# Patient Record
Sex: FEMALE | Race: WHITE
[De-identification: names, ages, dates, MRNs, and addresses within clinical notes are randomized per-mention and may not be internally consistent; named-entity substitution may affect disease eponyms.]

---

## 2019-01-23 PROBLEM — Z87.898 HISTORY OF MULTIPLE PULMONARY NODULES: Status: RESOLVED | Noted: 2019-01-23 | Resolved: 2019-01-23

## 2019-01-23 PROBLEM — F17.200 CURRENT EVERY DAY SMOKER: Status: ACTIVE | Noted: 2019-01-23

## 2019-01-23 PROBLEM — Z87.09 HISTORY OF CHRONIC OBSTRUCTIVE LUNG DISEASE: Status: RESOLVED | Noted: 2019-01-23 | Resolved: 2019-01-23

## 2019-01-23 RX ORDER — OMEPRAZOLE 20 MG/1
20 CAPSULE, DELAYED RELEASE ORAL
Refills: 0 | Status: ACTIVE | COMMUNITY

## 2019-01-23 RX ORDER — MORPHINE SULFATE 30 MG
TABLET ORAL
Refills: 0 | Status: ACTIVE | COMMUNITY

## 2019-01-23 RX ORDER — OXYCODONE AND ACETAMINOPHEN TABLETS 10; 300 MG/1; MG/1
TABLET ORAL
Refills: 0 | Status: ACTIVE | COMMUNITY

## 2019-01-24 ENCOUNTER — APPOINTMENT (OUTPATIENT)
Dept: THORACIC SURGERY | Facility: CLINIC | Age: 64
End: 2019-01-24
Payer: MEDICARE

## 2019-01-24 VITALS
SYSTOLIC BLOOD PRESSURE: 150 MMHG | HEIGHT: 62 IN | WEIGHT: 108 LBS | TEMPERATURE: 98.7 F | DIASTOLIC BLOOD PRESSURE: 65 MMHG | HEART RATE: 66 BPM | OXYGEN SATURATION: 89 % | RESPIRATION RATE: 19 BRPM | BODY MASS INDEX: 19.88 KG/M2

## 2019-01-24 DIAGNOSIS — F17.200 NICOTINE DEPENDENCE, UNSPECIFIED, UNCOMPLICATED: ICD-10-CM

## 2019-01-24 DIAGNOSIS — Z87.898 PERSONAL HISTORY OF OTHER SPECIFIED CONDITIONS: ICD-10-CM

## 2019-01-24 DIAGNOSIS — Z87.09 PERSONAL HISTORY OF OTHER DISEASES OF THE RESPIRATORY SYSTEM: ICD-10-CM

## 2019-01-24 DIAGNOSIS — R91.1 SOLITARY PULMONARY NODULE: ICD-10-CM

## 2019-01-24 PROCEDURE — 99214 OFFICE O/P EST MOD 30 MIN: CPT

## 2019-01-24 NOTE — REVIEW OF SYSTEMS
Capillary refill less/equal to 2 seconds/Strong peripheral pulses [Red Eyes] : red eyes [As Noted in HPI] : as noted in HPI [Negative] : Heme/Lymph

## 2019-01-29 NOTE — END OF VISIT
[FreeTextEntry3] : Documented by Chandler Tran acting as a scribe for Dr. Jan Montgomery on 1/24/2019

## 2019-01-29 NOTE — HISTORY OF PRESENT ILLNESS
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 \par \par CT scan completed on 1/11/19:\par -  interval cluster of centrilobular small nodules with tree in bud configuration within the middle lobe of the RIGHT lung. Appearance is most typical of small airway infectious/ inflammatory disease including with possible atypical mycobacterial infection.\par - site of ground glass opacity seen previously are either stable or have resolved, which may have represented sites of additional small airway disease\par - no pathologically enlarged axillary, mediastinal, or hilar adenopathy\par \par On this visit, the patient denies any complaints of a cough, hemoptysis, SOB, chest discomfort, palpitations, headaches, visual disturbances, decreased appetite, unexplained weight loss, fevers, aches, chills, or recent hospitalizations.

## 2019-01-29 NOTE — DATA REVIEWED
[FreeTextEntry1] : CT scan completed on 1/11/19:\par -  interval cluster of centrilobular small nodules with tree in bud configuration within the middle lobe of the RIGHT lung. Appearance is most typical of small airway infectious/ inflammatory disease including with possible atypical mycobacterial infection.\par - site of ground glass opacity seen previously are either stable or have resolved, which may have represented sites of additional small airway disease\par - no pathologically enlarged axillary, mediastinal, or hilar adenopathy

## 2019-01-29 NOTE — PHYSICAL EXAM
[General Appearance - Alert] : alert [Sclera] : the sclera and conjunctiva were normal [Outer Ear] : the ears and nose were normal in appearance [Hearing Threshold Finger Rub Not [place]] : hearing was normal [Both Tympanic Membranes Were Examined] : both tympanic membranes were normal [Neck Cervical Mass (___cm)] : no neck mass was observed [Respiration, Rhythm And Depth] : normal respiratory rhythm and effort [Exaggerated Use Of Accessory Muscles For Inspiration] : no accessory muscle use [Auscultation Breath Sounds / Voice Sounds] : lungs were clear to auscultation bilaterally [Heart Rate And Rhythm] : heart rate was normal and rhythm regular [Heart Sounds] : normal S1 and S2 [Chest Visual Inspection Thoracic Asymmetry] : no chest asymmetry [Diminished Respiratory Excursion] : normal chest expansion [2+] : left 2+ [No Pulse Delay] : no pulse delay [Bowel Sounds] : normal bowel sounds [Abdomen Soft] : soft [Abdomen Tenderness] : non-tender [Cervical Lymph Nodes Enlarged Posterior Bilaterally] : posterior cervical [Cervical Lymph Nodes Enlarged Anterior Bilaterally] : anterior cervical [Supraclavicular Lymph Nodes Enlarged Bilaterally] : supraclavicular [Axillary Lymph Nodes Enlarged Bilaterally] : axillary [No CVA Tenderness] : no ~M costovertebral angle tenderness [Involuntary Movements] : no involuntary movements were seen [Motor Tone] : muscle strength and tone were normal [] : no rash [Skin Lesions] : no skin lesions [Sensation] : the sensory exam was normal to light touch and pinprick [Motor Exam] : the motor exam was normal [Oriented To Time, Place, And Person] : oriented to person, place, and time [Impaired Insight] : insight and judgment were intact [Fingers] :  capillary refill of the fingers was normal [FreeTextEntry1] : n/a

## 2019-01-29 NOTE — ASSESSMENT
[FreeTextEntry1] : 63 year old female, current smoker (1 PPD, 62 pack years), with PMHx of COPD, acoustic neuroma s/p surgery in 1991, palpitations, chronic tail bone pain managed with narcotics, who presents to review recent surveillance CT scan. \par \par Overall, she is doing well. Denies any shortness of breath, cough, hemoptysis, chest pain, or other medical complaints at this time. CT Chest was reviewed with the patient. She and her  were made aware of an interval cluster of centrilobular small nodules with tree in bud configuration within the right middle lobe, appearance is most typical of small airway infectious/ inflammatory disease including with possible atypical mycobacterial infection. The numerous pulmonary parenchymal nodules seen on the prior study are essentially stable. The site of ground glass opacity seen previously are either stable or have resolved, which may have represented sites of additional small airway disease. We recommended a repeat CT chest in 6 months for reassessment. \par \par I have reviewed the patient's medical records and diagnostic images at the time of this office visit and have made the following recommendations\par Plan:\par 1. RTO in 6 months with repeat CT chest\par \par

## 2024-03-14 ENCOUNTER — APPOINTMENT (OUTPATIENT)
Dept: HEART AND VASCULAR | Facility: CLINIC | Age: 69
End: 2024-03-14
Payer: MEDICARE

## 2024-03-14 VITALS
WEIGHT: 117 LBS | HEIGHT: 62 IN | BODY MASS INDEX: 21.53 KG/M2 | RESPIRATION RATE: 12 BRPM | HEART RATE: 64 BPM | DIASTOLIC BLOOD PRESSURE: 70 MMHG | SYSTOLIC BLOOD PRESSURE: 122 MMHG

## 2024-03-14 DIAGNOSIS — Z87.891 PERSONAL HISTORY OF NICOTINE DEPENDENCE: ICD-10-CM

## 2024-03-14 DIAGNOSIS — R06.02 SHORTNESS OF BREATH: ICD-10-CM

## 2024-03-14 DIAGNOSIS — Z00.00 ENCOUNTER FOR GENERAL ADULT MEDICAL EXAMINATION W/OUT ABNORMAL FINDINGS: ICD-10-CM

## 2024-03-14 DIAGNOSIS — R09.89 OTHER SPECIFIED SYMPTOMS AND SIGNS INVOLVING THE CIRCULATORY AND RESPIRATORY SYSTEMS: ICD-10-CM

## 2024-03-14 PROCEDURE — 93880 EXTRACRANIAL BILAT STUDY: CPT

## 2024-03-14 PROCEDURE — 93306 TTE W/DOPPLER COMPLETE: CPT

## 2024-03-14 PROCEDURE — 99204 OFFICE O/P NEW MOD 45 MIN: CPT

## 2024-03-14 PROCEDURE — ZZZZZ: CPT

## 2024-03-14 PROCEDURE — 93000 ELECTROCARDIOGRAM COMPLETE: CPT

## 2024-03-14 RX ORDER — BACLOFEN 10 MG/1
10 TABLET ORAL
Refills: 0 | Status: ACTIVE | COMMUNITY

## 2024-03-14 RX ORDER — ASPIRIN 81 MG/1
TABLET, CHEWABLE ORAL
Refills: 0 | Status: ACTIVE | COMMUNITY

## 2024-03-14 RX ORDER — SIMVASTATIN 40 MG/1
40 TABLET, FILM COATED ORAL
Refills: 0 | Status: ACTIVE | COMMUNITY

## 2024-03-14 RX ORDER — METOPROLOL TARTRATE 50 MG/1
50 TABLET ORAL
Refills: 0 | Status: ACTIVE | COMMUNITY

## 2024-03-14 RX ORDER — TORSEMIDE 20 MG/1
20 TABLET ORAL
Refills: 0 | Status: ACTIVE | COMMUNITY

## 2024-03-14 NOTE — PHYSICAL EXAM
[Well Developed] : well developed [No Acute Distress] : no acute distress [Normal Conjunctiva] : normal conjunctiva [Normal Venous Pressure] : normal venous pressure [Carotid Bruit] : carotid bruit [Normal S1, S2] : normal S1, S2 [No Rub] : no rub [No Gallop] : no gallop [Clear Lung Fields] : clear lung fields [Murmur] : murmur [No Respiratory Distress] : no respiratory distress  [Good Air Entry] : good air entry [Soft] : abdomen soft [No Masses/organomegaly] : no masses/organomegaly [Non Tender] : non-tender [Normal Bowel Sounds] : normal bowel sounds [Normal Gait] : normal gait [No Cyanosis] : no cyanosis [No Varicosities] : no varicosities [No Clubbing] : no clubbing [No Rash] : no rash [Moves all extremities] : moves all extremities [No Skin Lesions] : no skin lesions [No Focal Deficits] : no focal deficits [Normal Speech] : normal speech [Alert and Oriented] : alert and oriented [Normal memory] : normal memory [de-identified] : cachectic [de-identified] : yvonne [de-identified] : 2+ b/l l e edema extending to b/l thighs

## 2024-03-14 NOTE — HISTORY OF PRESENT ILLNESS
[FreeTextEntry1] : 68-year-old female with past medical history of COPD pulmonary nodules neuroma status post placement of a deep brain stimulator for pain comes in for evaluation of worsening lower extremity edema.  Since November after patient had a hip fracture repair she began to accumulate fluid in bilateral lower extremities left worse than right.  Patient denies any chest pain shortness of breath PND orthopnea.

## 2024-03-14 NOTE — DISCUSSION/SUMMARY
[EKG obtained to assist in diagnosis and management of assessed problem(s)] : EKG obtained to assist in diagnosis and management of assessed problem(s) [FreeTextEntry1] : 1.  Shortness of breath: EKG reviewed normal sinus rhythm anteroseptal Q waves and anterior wall T wave inversions no old tracing available for comparison.  Will refer for an echocardiogram and advise once results are known 2.  Carotid bruit: Carotid duplex 3.  Health maintenance: Suggest abdominal imaging.  Patient to follow-up with primary care physician regarding this issue.  Addendum: Reviewed echocardiogram patient with severe pulm hypertension and RVH RV hypokinesis and dilatation.  Suggest right heart catheterization.

## 2024-03-15 ENCOUNTER — APPOINTMENT (OUTPATIENT)
Dept: HEART AND VASCULAR | Facility: CLINIC | Age: 69
End: 2024-03-15